# Patient Record
Sex: MALE | URBAN - METROPOLITAN AREA
[De-identification: names, ages, dates, MRNs, and addresses within clinical notes are randomized per-mention and may not be internally consistent; named-entity substitution may affect disease eponyms.]

---

## 2018-05-30 ENCOUNTER — HISTORICAL (OUTPATIENT)
Dept: ADMINISTRATIVE | Facility: HOSPITAL | Age: 33
End: 2018-05-30

## 2018-05-30 LAB
CHOLEST SERPL-MCNC: 173 MG/DL
CHOLEST/HDLC SERPL: 4.9 {RATIO} (ref 0–5)
HDLC SERPL-MCNC: 35 MG/DL
HIV 1+2 AB+HIV1 P24 AG SERPL QL IA: NONREACTIVE
LDLC SERPL CALC-MCNC: 120 MG/DL (ref 0–130)
TRIGL SERPL-MCNC: 88 MG/DL
VLDLC SERPL CALC-MCNC: 18 MG/DL

## 2018-06-20 ENCOUNTER — HISTORICAL (OUTPATIENT)
Dept: ADMINISTRATIVE | Facility: HOSPITAL | Age: 33
End: 2018-06-20

## 2018-06-20 LAB
APPEARANCE, UA: CLEAR
BACTERIA #/AREA URNS AUTO: ABNORMAL /[HPF]
BILIRUB UR QL STRIP: NEGATIVE
COLOR UR: YELLOW
GLUCOSE (UA): NORMAL
HGB UR QL STRIP: 0.1 MG/DL
HYALINE CASTS #/AREA URNS LPF: ABNORMAL /[LPF]
KETONES UR QL STRIP: NEGATIVE
LEUKOCYTE ESTERASE UR QL STRIP: NEGATIVE
NITRITE UR QL STRIP: NEGATIVE
PH UR STRIP: 5.5 [PH] (ref 4.5–8)
PROT UR QL STRIP: NEGATIVE
RBC #/AREA URNS AUTO: ABNORMAL /[HPF]
SP GR UR STRIP: 1.02 (ref 1–1.03)
SQUAMOUS #/AREA URNS LPF: ABNORMAL /[LPF]
UROBILINOGEN UR STRIP-ACNC: NORMAL
WBC #/AREA URNS AUTO: ABNORMAL /HPF

## 2018-06-22 LAB — FINAL CULTURE: NO GROWTH

## 2018-08-14 ENCOUNTER — HISTORICAL (OUTPATIENT)
Dept: ADMINISTRATIVE | Facility: HOSPITAL | Age: 33
End: 2018-08-14

## 2018-08-27 ENCOUNTER — HISTORICAL (OUTPATIENT)
Dept: ADMINISTRATIVE | Facility: HOSPITAL | Age: 33
End: 2018-08-27

## 2018-08-27 LAB
ALBUMIN SERPL-MCNC: 4.2 GM/DL (ref 3.4–5)
ALBUMIN/GLOB SERPL: 1 RATIO (ref 1–2)
ALP SERPL-CCNC: 66 UNIT/L (ref 45–117)
ALT SERPL-CCNC: 19 UNIT/L (ref 12–78)
APPEARANCE, UA: CLEAR
AST SERPL-CCNC: 15 UNIT/L (ref 15–37)
BACTERIA #/AREA URNS AUTO: ABNORMAL /[HPF]
BILIRUB SERPL-MCNC: 0.5 MG/DL (ref 0.2–1)
BILIRUB UR QL STRIP: NEGATIVE
BILIRUBIN DIRECT+TOT PNL SERPL-MCNC: 0.1 MG/DL
BILIRUBIN DIRECT+TOT PNL SERPL-MCNC: 0.4 MG/DL
BUN SERPL-MCNC: 18 MG/DL (ref 7–18)
CALCIUM SERPL-MCNC: 8.7 MG/DL (ref 8.5–10.1)
CHLORIDE SERPL-SCNC: 106 MMOL/L (ref 98–107)
CO2 SERPL-SCNC: 31 MMOL/L (ref 21–32)
COLOR UR: YELLOW
CREAT SERPL-MCNC: 1.1 MG/DL (ref 0.6–1.3)
GLOBULIN SER-MCNC: 4 GM/ML (ref 2.3–3.5)
GLUCOSE (UA): NORMAL
GLUCOSE SERPL-MCNC: 60 MG/DL (ref 74–106)
HGB UR QL STRIP: 0.06 MG/DL
HYALINE CASTS #/AREA URNS LPF: ABNORMAL /[LPF]
KETONES UR QL STRIP: NEGATIVE
LEUKOCYTE ESTERASE UR QL STRIP: NEGATIVE
NITRITE UR QL STRIP: NEGATIVE
PH UR STRIP: 6 [PH] (ref 4.5–8)
POTASSIUM SERPL-SCNC: 4 MMOL/L (ref 3.5–5.1)
PROT SERPL-MCNC: 8.2 GM/DL (ref 6.4–8.2)
PROT UR QL STRIP: 20 MG/DL
PSA SERPL-MCNC: 0.6 NG/ML
RBC #/AREA URNS AUTO: ABNORMAL /[HPF]
SODIUM SERPL-SCNC: 140 MMOL/L (ref 136–145)
SP GR UR STRIP: 1.03 (ref 1–1.03)
SQUAMOUS #/AREA URNS LPF: ABNORMAL /[LPF]
UROBILINOGEN UR STRIP-ACNC: 2 MG/DL
WBC #/AREA URNS AUTO: ABNORMAL /HPF

## 2018-08-29 LAB — FINAL CULTURE: NO GROWTH

## 2019-02-27 ENCOUNTER — HISTORICAL (OUTPATIENT)
Dept: ADMINISTRATIVE | Facility: HOSPITAL | Age: 34
End: 2019-02-27

## 2019-02-27 LAB — PSA SERPL-MCNC: 0.7 NG/ML

## 2019-03-14 ENCOUNTER — HISTORICAL (OUTPATIENT)
Dept: RADIOLOGY | Facility: HOSPITAL | Age: 34
End: 2019-03-14

## 2019-03-14 ENCOUNTER — HISTORICAL (OUTPATIENT)
Dept: ADMINISTRATIVE | Facility: HOSPITAL | Age: 34
End: 2019-03-14

## 2019-03-14 LAB
BUN SERPL-MCNC: 15 MG/DL (ref 7–18)
CALCIUM SERPL-MCNC: 8.8 MG/DL (ref 8.5–10.1)
CHLORIDE SERPL-SCNC: 109 MMOL/L (ref 98–107)
CO2 SERPL-SCNC: 26 MMOL/L (ref 21–32)
CREAT SERPL-MCNC: 0.8 MG/DL (ref 0.6–1.3)
CREAT/UREA NIT SERPL: 19
GLUCOSE SERPL-MCNC: 87 MG/DL (ref 74–106)
POTASSIUM SERPL-SCNC: 4.1 MMOL/L (ref 3.5–5.1)
SODIUM SERPL-SCNC: 138 MMOL/L (ref 136–145)

## 2019-07-12 ENCOUNTER — HISTORICAL (OUTPATIENT)
Dept: ADMINISTRATIVE | Facility: HOSPITAL | Age: 34
End: 2019-07-12

## 2019-07-12 LAB
APPEARANCE, UA: CLEAR
BACTERIA #/AREA URNS AUTO: ABNORMAL /[HPF]
BILIRUB UR QL STRIP: NEGATIVE
COLOR UR: COLORLESS
GLUCOSE (UA): NORMAL
HGB UR QL STRIP: 0.03 MG/DL
HYALINE CASTS #/AREA URNS LPF: ABNORMAL /[LPF]
KETONES UR QL STRIP: NEGATIVE
LEUKOCYTE ESTERASE UR QL STRIP: NEGATIVE
NITRITE UR QL STRIP: NEGATIVE
PH UR STRIP: 5.5 [PH] (ref 4.5–8)
PROT UR QL STRIP: NEGATIVE
RBC #/AREA URNS AUTO: ABNORMAL /[HPF]
SP GR UR STRIP: 1.01 (ref 1–1.03)
SQUAMOUS #/AREA URNS LPF: ABNORMAL /[LPF]
UROBILINOGEN UR STRIP-ACNC: NORMAL
WBC #/AREA URNS AUTO: ABNORMAL /HPF

## 2019-07-26 ENCOUNTER — HISTORICAL (OUTPATIENT)
Dept: RADIOLOGY | Facility: HOSPITAL | Age: 34
End: 2019-07-26

## 2019-07-26 LAB
ALBUMIN SERPL-MCNC: 4 GM/DL (ref 3.4–5)
ALBUMIN/GLOB SERPL: 1.1 RATIO (ref 1.1–2)
ALP SERPL-CCNC: 58 UNIT/L (ref 45–117)
ALT SERPL-CCNC: 30 UNIT/L (ref 12–78)
AST SERPL-CCNC: 19 UNIT/L (ref 15–37)
BILIRUB SERPL-MCNC: 0.3 MG/DL (ref 0.2–1)
BILIRUBIN DIRECT+TOT PNL SERPL-MCNC: 0.1 MG/DL
BILIRUBIN DIRECT+TOT PNL SERPL-MCNC: 0.2 MG/DL
BUN SERPL-MCNC: 16 MG/DL (ref 7–18)
CALCIUM SERPL-MCNC: 9 MG/DL (ref 8.5–10.1)
CHLORIDE SERPL-SCNC: 110 MMOL/L (ref 98–107)
CO2 SERPL-SCNC: 28 MMOL/L (ref 21–32)
CREAT SERPL-MCNC: 0.9 MG/DL (ref 0.6–1.3)
GLOBULIN SER-MCNC: 3.6 GM/ML (ref 2.3–3.5)
GLUCOSE SERPL-MCNC: 93 MG/DL (ref 74–106)
POTASSIUM SERPL-SCNC: 4.3 MMOL/L (ref 3.5–5.1)
PROT SERPL-MCNC: 7.6 GM/DL (ref 6.4–8.2)
SODIUM SERPL-SCNC: 142 MMOL/L (ref 136–145)

## 2019-10-08 ENCOUNTER — HISTORICAL (OUTPATIENT)
Dept: ENDOSCOPY | Facility: HOSPITAL | Age: 34
End: 2019-10-08

## 2020-03-12 ENCOUNTER — HISTORICAL (OUTPATIENT)
Dept: ADMINISTRATIVE | Facility: HOSPITAL | Age: 35
End: 2020-03-12

## 2020-03-16 LAB — FINAL CULTURE: NORMAL

## 2022-04-10 ENCOUNTER — HISTORICAL (OUTPATIENT)
Dept: ADMINISTRATIVE | Facility: HOSPITAL | Age: 37
End: 2022-04-10

## 2022-04-30 VITALS
HEIGHT: 67 IN | SYSTOLIC BLOOD PRESSURE: 116 MMHG | SYSTOLIC BLOOD PRESSURE: 118 MMHG | DIASTOLIC BLOOD PRESSURE: 82 MMHG | SYSTOLIC BLOOD PRESSURE: 120 MMHG | DIASTOLIC BLOOD PRESSURE: 68 MMHG | DIASTOLIC BLOOD PRESSURE: 72 MMHG | HEIGHT: 67 IN | HEIGHT: 67 IN | SYSTOLIC BLOOD PRESSURE: 114 MMHG | OXYGEN SATURATION: 98 % | BODY MASS INDEX: 21.42 KG/M2 | HEIGHT: 69 IN | DIASTOLIC BLOOD PRESSURE: 81 MMHG | OXYGEN SATURATION: 99 % | WEIGHT: 136.44 LBS | WEIGHT: 136.69 LBS | BODY MASS INDEX: 21.35 KG/M2 | WEIGHT: 143.31 LBS | WEIGHT: 136 LBS | BODY MASS INDEX: 21.45 KG/M2 | BODY MASS INDEX: 21.22 KG/M2 | OXYGEN SATURATION: 98 %

## 2022-04-30 NOTE — OP NOTE
DATE OF SURGERY:        SURGEON:  Julio Lynne MD    attending physician:  Dr. Julio Lynne MD    PREOP DIAGNOSES:    1. Dysuria.  2. Microscopic hematuria.    POSTOP DIAGNOSES:    1. Dysuria.  2. Microscopic hematuria.    PROCEDURE:  Cystoscopy.    FINDINGS:  Mild inflammatory change of the prostate.  No blood loss.  No complications.    INDICATIONS:  A 34-year-old male, previously seen by __________ for dysuria and microscopic hematuria.  He has persistent burning with urination, a little hard getting started.  No history of stones or problems.  He comes in for cystoscopy.    DESCRIPTION OF PROCEDURE:  Taken to the cysto suite, placed supine.  He was prepped with Betadine.  Viscous lidocaine instilled into the urethra.  Flexible scope was inserted.  Urethra was unremarkable.  No evidence of stricture disease.  Looking in the prostatic fossa, mild inflammatory change of the prostatic fossa into the bladder neck.  There are slightly raised papillary changes of the mucosa.  Looking in the bladder, ureteral orifice is normal size, shape, position, __________ bilaterally.  There is no tumor, stones, or foreign bodies seen.  Dome and bladder neck reviewed by retroflexion scope.  The bladder neck also appeared to have the erythematous change.  Likelihood this is chronic prostatitis.  However, I will submit a FISH.  If his symptoms persist, or this shows just any other abnormality, we may need to biopsy this.  I am going to put him on Cipro for 10 days.  He is going to follow up with     __________ in 2 months.  Pending the results, we will decide on whether or not to repeat a cystoscopy to see if this is resolved, and/or a biopsy.        ______________________________  Julio Lynne MD    TAB/MODL  DD:  10/08/2019  Time:  11:49AM  DT:  10/08/2019  Time:  12:19PM  Job #:  [id number]

## 2022-04-30 NOTE — PROGRESS NOTES
Patient:   Lola Ceja             MRN: [id number]            FIN: [id number]               Age:   33 years     Sex:  Male     :  1985   Associated Diagnoses:   Urination pain   Author:   Myranda Castro MD      Subjective   Chief complaint 2018 15:17 CDT      FOLLOWUP ON BURNING WITH URINATION..     34 yo male here for follow up to recent burning/painful urination  Results reviewed. Symptoms continue to occur intermittently.   Occasionally feels incomplete voiding.   No history of kidney stones.  No hematuria.   No abdominal pain.   Otherwise no complaints.     ROS: Denies CP, SOB, N/V/D, HA, Dizziness, Fever or Chills      Health Status   Current medications:    No qualifying data available     Problem list:    All Problems  Tobacco user / SNOMED CT [id number] / Confirmed      Objective      Vital Signs (last 24 hrs)_____  Last Charted___________  Temp Oral     37 DegC  (AUG 14 15:17)  Heart Rate Peripheral   88 bpm  (AUG 14 15:)  Resp Rate         20 br/min  (AUG 14 15:17)  SBP      114 mmHg  (AUG 14 15:17)  DBP      72 mmHg  (AUG 14 15:17)  SpO2      98 %  (AUG 14 15:17)  Weight      61.90 kg  (AUG 14 15:17)  Height      170.18 cm  (AUG 14 15:17)  BMI      21.37  (AUG 14 15:17)     General:  Alert and oriented, No acute distress.    Eye:  Normal conjunctiva.    HENT:  Normocephalic.    Neck:  Supple.    Respiratory:  Lungs are clear to auscultation, Respirations are non-labored, Breath sounds are equal, Symmetrical chest wall expansion.    Cardiovascular:  Normal rate, Regular rhythm.    Gastrointestinal:  Soft, Non-tender, Non-distended, Normal bowel sounds.    Musculoskeletal:  Normal range of motion, Normal strength, No tenderness, No swelling.    Integumentary:  Warm, Dry, No rash.    Psychiatric:  Cooperative.       Results Review     Reason For Exam  cholelithiasis? Kidney stones. Painful urination, not UTI.;Other (please specify)    Radiology Report  Indication: Dysuria     Findings:  Osseous structures appear unremarkable. No abnormal  calcifications are visible. No bowel distention, free air or abnormal  mass effect is identified. The lung bases are clear.     IMPRESSION: Radiographically benign abdomen.       Signature Line  Electronically Signed By: Rocky Trimble MD  Date/Time Signed: 08/14/2018 16:19         Impression and Plan   Assessment and Plan:          Diagnosis: Urination pain ([id number]-CO R30.9).    Orders      (Selected)   Outpatient Orders  Completed  XR Abdomen KUB 1 View: Routine, 08/14/18 16:13:00 CDT, Other (please specify), cholelithiasis? Kidney stones. Painful urination, not UTI., None, Ambulatory, Rad Type, Urination pain, Not Scheduled, 08/14/18 16:13:00 CDT  Prescriptions  Prescribed  Flomax 0.4 mg oral capsule: 0.4 mg = 1 cap(s), Oral, Daily, # 30 cap(s), 0 Refill(s), Pharmacy: [facility].     .    Will start on Flomax to see if it helps with what sounds like obstructive symptoms.   The patient deferred a  exam due to Yarsanism beliefs.  Will get KUB to see if there are any stones.   Will follow up a few weeks to see if his symptoms are improved.   Will possibly refer to urology for a cystoscopy/evaluation.

## 2022-05-02 NOTE — HISTORICAL [facility] CERNER
This is a historical note converted from Jhon. Formatting and pictures may have been removed.  Please reference Jhon for original formatting and attached multimedia. Chief Complaint  F/U FOR WART TO RIGHT FOOT QUESTIONABLE WARTS TO BOO HANDS  History of Present Illness  34 y/o M here for f/u Warts.? recieve candida injections.?  ?   c/o dysuria today, x 1 day, no fever, no chills, no hematuria.?no scrotal pain  ?   Right heel much improved. good portion is healed. Smaller that it has ever been.?  Here? to have candida injection 7/7..?  ?  ?  Review of Systems  Constitutional:no fevers, no chills, no headache  CV: no chest pain, no palpitations, no edema  Pulm:no SOB, no Cough, no sputum  GI:no abdominal pain, no nausea, no vomiting,  :no dysuria, no hematuria, no discharge  MSK: no musculoskeletal pain, no joint pain  Skin:per HPI  Neurologic:no weakness, no dizziness, no focal deficits  All other ROS within normal limits  Physical Exam  Vitals & Measurements  T:?36.6? ?C (Oral)? HR:?85(Peripheral)? RR:?20? BP:?120/81? SpO2:?98%?  HT:?170.18?cm? HT:?170.18?cm? WT:?61.70?kg? WT:?61.70?kg? BMI:?21.3?  Gen: nad  Eye: EOMI, PERRLA  ENMT: moist mucus membranes, no tonsillar exudates or erythema, no pharyngeal erythema or edema  Pulm:CTAB,  Neck: Supple, no lymphadenopathy, No thyromegaly or masses,  CV: RRR, normal s1s2, no M/R/G, no JVD, no HJR,  GI: bowel sounds in all 4 quadrants, non-tender, no rebound,no guarding, no masses  skin: plantar/heel wart on right foot.  MSK: Normal ROM, no deformity  Neuro: no focal deficits, no loss of sensation.  ?  Date/ Time:??6/20/18???  Informed consent: obtained by patient. ??  Confirmed: patient, procedure, side, site, safety procedures followed.????  Preparation: sterile preparation of site (in usual fashion, with 70 % alcohol). ??  Procedure performed: Indication Plantar heel wart, Location (right, Heel). ??  Procedure tolerated: well. ??  Complications:  none.  ?  Assessment/Plan  Warts  ?candida injection 7/7 today  rtc 1 month  Ordered:  Clinic Follow up, *Est. 07/20/18 3:00:00 CDT, Order for future visit, [facility]  ?  dysuria  ?urine dip?not concerning for UTI  will send UA and culture   Problem List/Past Medical History  Ongoing  Tobacco user  Historical  No qualifying data  Medications  No active medications  Allergies  No Known Allergies  Social History  Alcohol  Never, 07/10/2017  Employment/School  Employed, 05/30/2018  Exercise  Home/Environment  Lives with Spouse. Living situation: Home/Independent. Alcohol abuse in household: No. Substance abuse in household: No. Smoker in household: No. Injuries/Abuse/Neglect in household: No., 05/30/2018  Nutrition/Health  Regular, 05/30/2018  Sexual  Substance Abuse  Never, 07/10/2017  Tobacco  Current every day smoker Use:. Cigarettes Type:., 06/20/2018  Family History  Hypertension.: Mother.  Primary malignant neoplasm of bone: Father.  Health Maintenance  Health Maintenance  ???Pending?(in the next year)  ??? ??Due?  ??? ? ? ?Alcohol Misuse Screening due??06/20/18??and every 1??year(s)  ??? ? ? ?Tetanus Vaccine due??06/20/18??and every 10??year(s)  ??? ??Due In Future?  ??? ? ? ?Blood Pressure Screening not due until??05/30/19??and every 1??year(s)  ??? ? ? ?Body Mass Index Check not due until??05/30/19??and every 1??year(s)  ??? ? ? ?Depression Screening not due until??05/30/19??and every 1??year(s)  ???Satisfied?(in the past 1 year)  ??? ??Satisfied?  ??? ? ? ?Blood Pressure Screening on??06/20/18.??Satisfied by Josefina Glover  ??? ? ? ?Body Mass Index Check on??06/20/18.??Satisfied by Josefina Glover  ??? ? ? ?Depression Screening on??06/20/18.??Satisfied by Josefina Glover  ??? ? ? ?Obesity Screening on??06/20/18.??Satisfied by Josefina Glover  ??? ? ? ?Smoking Cessation on??06/20/18.??Satisfied by Josefina Glover  ??? ? ?  ?Tobacco Use Screening on??06/20/18.??Satisfied by Josefina Glover  ?  ?      Pt discussed with resident at the time of visit. Agree with assessment and plan.

## 2022-05-02 NOTE — HISTORICAL [facility] CERNER
This is a historical note converted from Jhon. Formatting and pictures may have been removed.  Please reference Jhon for original formatting and attached multimedia. Chief Complaint  REFERRAL, HEMATURIA, BURNING ON URINATION  History of Present Illness  Pt referred for hematuria and dysuria.? Given flomax.? KUB in Aug.? No CT.  Review of Systems  12 point ROS negative other than HPI  Physical Exam  Vitals & Measurements  T:?36.4? ?C (Oral)? HR:?61(Peripheral)? RR:?20? BP:?103/61? WT:?62.3?kg?  GEN: WNWD NAD  HEENT: NCAT  CV: RRR  RESP: unlabored  ABD: soft NT/ND  : NEMG, KOLBY benign  EXT: no C/C/E  NEURO: AAOx4 no focal deficits  Assessment/Plan  1.?Urination pain?R30.9  ?-Will get CTU for hematuria.? PSA today.? KOLBY benign.? Will schedule cysto.? Cont flomax for now.   Problem List/Past Medical History  Ongoing  Tobacco user  Urination pain  Historical  No qualifying data  Medications  Flomax 0.4 mg oral capsule, 0.4 mg= 1 cap(s), Oral, Daily,? ?Not Taking, Completed Rx  Allergies  No Known Allergies  Social History  Alcohol  Never, 07/10/2017  Employment/School  Employed, 05/30/2018  Exercise  Home/Environment  Lives with Spouse. Living situation: Home/Independent. Alcohol abuse in household: No. Substance abuse in household: No. Smoker in household: No. Injuries/Abuse/Neglect in household: No., 05/30/2018  Nutrition/Health  Regular, 05/30/2018  Sexual  Substance Abuse  Never, 07/10/2017  Tobacco  10 or more cigarettes (1/2 pack or more)/day in last 30 days, Cigarettes, No, 02/27/2019  Family History  Hypertension.: Mother.  Primary malignant neoplasm of bone: Father.

## 2022-05-02 NOTE — HISTORICAL [facility] CERNER
This is a historical note converted from Jhon. Formatting and pictures may have been removed.  Please reference Jhon for original formatting and attached multimedia. Chief Complaint  bilateral ear pain x 1week, dizziness, pain and presure in the back of his head  History of Present Illness  34 m reports to [facility] complaining of ear pressure and sore throat x1 week.?+ dizziness on certain body positions and at random times. worse when sleep deprived, better with sleep.??Also with?sore throat. ?Continues to have?burning on urination?occasionally.? Was seen by urology?and?has?cystoscopy planned. ?Sexually active.  Upon reviewing chart, patient had CT abdomen?which showed some pulmonary nodules and liver cysts.  Review of Systems  General: No fever, no chills, no changes in weight, no fatigue  CVS: No chest pain, No palpitations, no edema  Resp: No SOB, no cough, no difficulty breathing  ENT:? No vision changes,  Musculoskeletal: No weakness, no change in gait  :? No dysuria, no hematuria  GI: No nausea, vomiting, or diarrhea  Neuro: No syncope, no dizziness, no paresthesias  Psych: No anxiety, no suicidal or homicide ideations,  Physical Exam  Vitals & Measurements  T:?36.8? ?C (Oral)? HR:?76(Peripheral)? RR:?17? BP:?109/71? SpO2:?99%?  HT:?175?cm? WT:?62.95?kg? BMI:?20.56?  General:? well developed; NAD  HEENT:? oral mucosa moist,?+ pharyngeal erythema, EOMI, + heaven-Hallpike, Left ear canal with small amount of white material  CV:? RRR, no murmurs, no rubs, no gallops; 2+ pulses on all 4 extremities  Resp:? non-labored breathing, symmetrical chest expansion bilaterally, no wheezing, no crackles, no stridor  Abd:? soft, non-distended, no TTP, +BS, no organomegaly  MSK:? 5/5 strength in all 4 extremities,  Skin:? warm, dry, pink, no rashes or sores  Neuro:? AAOx4, appropriate mood and affect  ?  Assessment/Plan  1.?Ear pain?H92.09  ?bilateral ear pressure, + heaven-hallpike, + pharygeal erythema  Diff Dx: BPPV, ET  dysfunction  Rx for meclizine and flonase sent, encouraged NSAIDs otc  Ordered:  Clinic Follow up, *Est. 08/02/19 14:40:00 CDT, Order for future visit, Urination pain, [facility]  ?  2.?Urination pain?R30.9  ?UA ordered and GC/Chlamydia  Ordered:  Chlamydia trach and N. gonorrhea PCR, Routine collect, Urine, Order for future visit, *Est. 07/12/19 3:00:00 CDT, *Est. Stop date 07/12/19 3:00:00 CDT, Nurse collect, Urination pain  Clinic Follow up, *Est. 08/02/19 14:40:00 CDT, Order for future visit, Urination pain, [facility]  Urinalysis with Microscopic if Indicated, Routine collect, Urine, Order for future visit, *Est. 07/12/19 3:00:00 CDT, *Est. Stop date 07/12/19 3:00:00 CDT, Nurse collect, Urination pain  ?  3.?Vertigo?R42  ?see above  Ordered:  Clinic Follow up, *Est. 08/02/19 14:40:00 CDT, Order for future visit, Urination pain, [facility]  ?  4.?Pulmonary nodule?R91.1  -noted on last CT scan in 3/2019 and rec surveillance 6 month later  -will order CT thorax?for 9/2019  ?  Ordered:  CT Thorax W Contrast, Routine, *Est. 09/18/19 3:00:00 CDT, Nodules, None, Ambulatory, Rad Type, Order for future visit, Pulmonary nodule, Schedule this test, [facility], *Est. 09/18/19 3:00:00 CDT  ?  Orders:  fluticasone nasal, 2 spray(s), Nasal, BID, # 1 bottle(s), 0 Refill(s)  meclizine, 25 mg = 1 tab(s), Oral, BID, X 30 day(s), # 60 tab(s), 0 Refill(s)  RTC in 1 month  Referrals  Clinic Follow up, *Est. 08/02/19 14:40:00 CDT, Order for future visit, Urination pain, [facility]   Problem List/Past Medical History  Ongoing  Tobacco user  Urination pain  Historical  No qualifying data  Medications  Flonase 50 mcg/inh nasal spray, 2 spray(s), Nasal, BID  meclizine 25 mg oral tablet, 25 mg= 1 tab(s), Oral, BID  Allergies  No Known Allergies  Social History  Abuse/Neglect  No, No, Yes, 07/12/2019  Alcohol  Never,  07/10/2017  Employment/School  Employed, 05/30/2018  Exercise  Home/Environment  Lives with Spouse. Living situation: Home/Independent. Alcohol abuse in household: No. Substance abuse in household: No. Smoker in household: No. Injuries/Abuse/Neglect in household: No., 05/30/2018  Nutrition/Health  Regular, 05/30/2018  Sexual  Substance Use  Never, 07/10/2017  Tobacco  10 or more cigarettes (1/2 pack or more)/day in last 30 days, No, Household tobacco concerns: No. Smokeless Tobacco Use: Never., 07/12/2019  10 or more cigarettes (1/2 pack or more)/day in last 30 days, Cigarettes, No, 02/27/2019  Family History  Hypertension.: Mother.  Primary malignant neoplasm of bone: Father.  Health Maintenance  Health Maintenance  ???Pending?(in the next year)  ??? ??OverDue  ??? ? ? ?Alcohol Misuse Screening due??01/01/19??and every 1??year(s)  ??? ? ? ?Smoking Cessation due??01/01/19??and every 1??year(s)  ??? ??Due?  ??? ? ? ?ADL Screening due??07/12/19??and every 1??year(s)  ??? ? ? ?Tetanus Vaccine due??07/12/19??and every 10??year(s)  ??? ??Due In Future?  ??? ? ? ?Depression Screening not due until??08/27/19??and every 1??year(s)  ??? ? ? ?Obesity Screening not due until??01/01/20??and every 1??year(s)  ??? ? ? ?Blood Pressure Screening not due until??07/11/20??and every 1??year(s)  ??? ? ? ?Body Mass Index Check not due until??07/11/20??and every 1??year(s)  ???Satisfied?(in the past 1 year)  ??? ??Satisfied?  ??? ? ? ?Blood Pressure Screening on??07/12/19.??Satisfied by Massiel Acevedo  ??? ? ? ?Body Mass Index Check on??07/12/19.??Satisfied by Massiel Acevedo  ??? ? ? ?Depression Screening on??08/27/18.??Satisfied by Kevin Iniguez LPN  ??? ? ? ?Obesity Screening on??07/12/19.??Satisfied by Massiel Acevedo  ??? ? ? ?Smoking Cessation on??08/27/18.??Satisfied by Kevin Iniguez LPN  ?      Pt discussed with resident. I have read the note and the?management and plan are reasonable and?appropriate.